# Patient Record
Sex: MALE | Race: WHITE | NOT HISPANIC OR LATINO | Employment: FULL TIME | URBAN - METROPOLITAN AREA
[De-identification: names, ages, dates, MRNs, and addresses within clinical notes are randomized per-mention and may not be internally consistent; named-entity substitution may affect disease eponyms.]

---

## 2017-08-28 ENCOUNTER — CLINICAL SUPPORT (OUTPATIENT)
Dept: AUDIOLOGY | Facility: CLINIC | Age: 32
End: 2017-08-28
Payer: COMMERCIAL

## 2017-08-28 ENCOUNTER — OFFICE VISIT (OUTPATIENT)
Dept: OTOLARYNGOLOGY | Facility: CLINIC | Age: 32
End: 2017-08-28
Payer: COMMERCIAL

## 2017-08-28 VITALS
BODY MASS INDEX: 26.37 KG/M2 | DIASTOLIC BLOOD PRESSURE: 80 MMHG | HEIGHT: 68 IN | SYSTOLIC BLOOD PRESSURE: 136 MMHG | WEIGHT: 174 LBS

## 2017-08-28 DIAGNOSIS — H90.0 CONDUCTIVE HEARING LOSS, BILATERAL: Primary | ICD-10-CM

## 2017-08-28 DIAGNOSIS — H90.0 CONDUCTIVE HEARING LOSS, BILATERAL: ICD-10-CM

## 2017-08-28 DIAGNOSIS — J01.90 ACUTE NON-RECURRENT SINUSITIS, UNSPECIFIED LOCATION: ICD-10-CM

## 2017-08-28 DIAGNOSIS — H65.03 BILATERAL ACUTE SEROUS OTITIS MEDIA, RECURRENCE NOT SPECIFIED: Primary | ICD-10-CM

## 2017-08-28 PROCEDURE — 3008F BODY MASS INDEX DOCD: CPT | Mod: S$GLB,,, | Performed by: NURSE PRACTITIONER

## 2017-08-28 PROCEDURE — 99999 PR PBB SHADOW E&M-EST. PATIENT-LVL III: CPT | Mod: PBBFAC,,, | Performed by: NURSE PRACTITIONER

## 2017-08-28 PROCEDURE — 99203 OFFICE O/P NEW LOW 30 MIN: CPT | Mod: S$GLB,,, | Performed by: NURSE PRACTITIONER

## 2017-08-28 PROCEDURE — 92557 COMPREHENSIVE HEARING TEST: CPT | Mod: S$GLB,,, | Performed by: AUDIOLOGIST-HEARING AID FITTER

## 2017-08-28 PROCEDURE — 92567 TYMPANOMETRY: CPT | Mod: S$GLB,,, | Performed by: AUDIOLOGIST-HEARING AID FITTER

## 2017-08-28 RX ORDER — AZELASTINE 1 MG/ML
1 SPRAY, METERED NASAL 2 TIMES DAILY
Qty: 30 ML | Refills: 2 | Status: SHIPPED | OUTPATIENT
Start: 2017-08-28 | End: 2018-08-28

## 2017-08-28 RX ORDER — AMOXICILLIN AND CLAVULANATE POTASSIUM 875; 125 MG/1; MG/1
1 TABLET, FILM COATED ORAL EVERY 12 HOURS
Qty: 20 TABLET | Refills: 0 | Status: SHIPPED | OUTPATIENT
Start: 2017-08-28 | End: 2017-09-07

## 2017-08-28 RX ORDER — PSEUDOEPHEDRINE HCL 120 MG/1
120 TABLET, FILM COATED, EXTENDED RELEASE ORAL
COMMUNITY

## 2017-08-28 NOTE — PROGRESS NOTES
Subjective:       Patient ID: Gorge Mcintosh is a 31 y.o. male.    Chief Complaint: Otalgia and Ear Fullness    Sinusitis   This is a new problem. The current episode started in the past 7 days. The problem is unchanged. There has been no fever. He is experiencing no pain. Associated symptoms include sinus pressure. Pertinent negatives include no chills, congestion, coughing, diaphoresis, ear pain, headaches, hoarse voice, neck pain, shortness of breath, sneezing, sore throat or swollen glands. Past treatments include oral decongestants (Sudafed). The treatment provided no relief.   Otalgia    There is pain in both ears. This is a new problem. The current episode started in the past 7 days. The problem occurs constantly. The problem has been unchanged. There has been no fever. The pain is at a severity of 2/10. The pain is mild. Associated symptoms include hearing loss. Pertinent negatives include no abdominal pain, coughing, diarrhea, ear discharge, headaches, neck pain, rash, rhinorrhea, sore throat or vomiting. Treatments tried: Sudafed. The treatment provided no relief. There is no history of a chronic ear infection, hearing loss or a tympanostomy tube. Recent airplane flight to and from [place]        Past Medical History: Patient has no past medical history on file.    Past Surgical History: Patient has no past surgical history on file.    Social History: Patient reports that he has never smoked. He has never used smokeless tobacco. He reports that he does not drink alcohol or use drugs.    Family History: family history is not on file.    Medications:   Current Outpatient Prescriptions   Medication Sig    amoxicillin-clavulanate 875-125mg (AUGMENTIN) 875-125 mg per tablet Take 1 tablet by mouth every 12 (twelve) hours.    pseudoephedrine (SUDAFED) 120 mg 12 hr tablet Take 120 mg by mouth every 12 (twelve) hours.    azelastine (ASTELIN) 137 mcg (0.1 %) nasal spray 1 spray (137 mcg total) by Nasal  "route 2 (two) times daily.     No current facility-administered medications for this visit.        Allergies: Patient has No Known Allergies.    Review of Systems   Constitutional: Negative for activity change, appetite change, chills, diaphoresis, fatigue, fever and unexpected weight change.   HENT: Positive for hearing loss, postnasal drip and sinus pressure. Negative for congestion, dental problem, ear discharge, ear pain, facial swelling, hoarse voice, mouth sores, nosebleeds, rhinorrhea, sneezing, sore throat, tinnitus, trouble swallowing and voice change.    Eyes: Negative for pain and visual disturbance.   Respiratory: Negative for cough, choking, chest tightness, shortness of breath, wheezing and stridor.    Cardiovascular: Negative for chest pain.   Gastrointestinal: Negative for abdominal pain, diarrhea, nausea and vomiting.   Musculoskeletal: Negative for gait problem, neck pain and neck stiffness.   Skin: Negative for color change, rash and wound.   Allergic/Immunologic: Negative for environmental allergies.   Neurological: Negative for dizziness, seizures, syncope, facial asymmetry, speech difficulty, weakness, light-headedness, numbness and headaches.   Psychiatric/Behavioral: Negative for agitation, confusion and decreased concentration. The patient is not nervous/anxious.        Objective:       /80 (BP Location: Right arm, Patient Position: Sitting)   Ht 5' 8" (1.727 m)   Wt 78.9 kg (174 lb)   BMI 26.46 kg/m²     Physical Exam   Constitutional: He is oriented to person, place, and time. He appears well-developed and well-nourished.   HENT:   Head: Normocephalic and atraumatic. Not macrocephalic and not microcephalic. Head is without raccoon's eyes, without Agrawal's sign, without abrasion, without contusion, without laceration, without right periorbital erythema and without left periorbital erythema. Hair is normal.   Right Ear: External ear and ear canal normal. No lacerations. No drainage, " swelling or tenderness. No foreign bodies. No mastoid tenderness. Tympanic membrane is retracted. Tympanic membrane is not injected, not scarred, not perforated, not erythematous and not bulging. Tympanic membrane mobility is normal. A middle ear effusion is present. No hemotympanum. No decreased hearing is noted.   Left Ear: External ear and ear canal normal. No lacerations. No drainage, swelling or tenderness. No foreign bodies. No mastoid tenderness. Tympanic membrane is retracted. Tympanic membrane is not injected, not scarred, not perforated, not erythematous and not bulging. Tympanic membrane mobility is normal. A middle ear effusion is present. No hemotympanum. No decreased hearing is noted.   Nose: Nose normal. No mucosal edema, rhinorrhea, nose lacerations, sinus tenderness or nasal deformity.   Mouth/Throat: Uvula is midline.   No OE.  Facial Nerve Intact.   Eyes: Conjunctivae, EOM and lids are normal. Pupils are equal, round, and reactive to light.   Neck: Trachea normal and normal range of motion. Neck supple. No spinous process tenderness and no muscular tenderness present. No neck rigidity. No edema, no erythema and normal range of motion present. No thyroid mass and no thyromegaly present.   Pulmonary/Chest: Effort normal.   Abdominal: Soft.   Musculoskeletal: Normal range of motion.   Lymphadenopathy:        Head (right side): No submental, no submandibular, no tonsillar, no preauricular and no posterior auricular adenopathy present.        Head (left side): No submental, no submandibular, no tonsillar, no preauricular, no posterior auricular and no occipital adenopathy present.     He has no cervical adenopathy.   Neurological: He is alert and oriented to person, place, and time. No cranial nerve deficit or sensory deficit.   Skin: Skin is warm and dry.   Psychiatric: He has a normal mood and affect. His behavior is normal. Judgment and thought content normal.   Nursing note and vitals reviewed.       As a result of this patients history and examination findings, a comprehensive audiogram was ordered to determine the level of hearing/hearing loss.        Assessment:       1. Bilateral acute serous otitis media, recurrence not specified    2. Conductive hearing loss, bilateral    3. Acute non-recurrent sinusitis, unspecified location        Plan:       Medrol dose pack.  Augmentin 875/125 mg po twice per day x 10 days.  Ears and Altitude pamphlet with emphasis on insufflation techniques.  In office politzerization attempted (successful).  No airplane travel.  Yeison Med Sinus Rinse daily; distilled water only.  OTC Flonase twice per day (spray laterally).  Astelin twice per day (spray laterally).  OTC Claritin/Zyrtec prn (allergy symptoms).  RTC in 2 weeks with repeat audio with tymps.

## 2017-08-28 NOTE — PROGRESS NOTES
Gorge Mcintosh was seen in the clinic today for a hearing evaluation. Mr. Mcintosh reported hearing loss worse in his left ear following a flight.    Audiological testing revealed a mild low frequency conductive hearing loss in the right ear and a mild conductive hearing loss in the left ear. A speech reception threshold was obtained at 10 dBHL in the right ear and 25 dBHL in the left ear. Speech discrimination was 96% in the right ear and 92% in the left ear.    Tympanometry revealed a Type C tympanogram in the right ear and a flat Type B tympanogram with normal ear canal volume in the left ear.    Recommendations:  1. Otologic evaluation  2. Follow-up hearing evaluation

## 2019-03-13 ENCOUNTER — HOSPITAL ENCOUNTER (EMERGENCY)
Facility: OTHER | Age: 34
Discharge: HOME | End: 2019-03-13
Attending: EMERGENCY MEDICINE
Payer: COMMERCIAL

## 2019-03-13 VITALS
TEMPERATURE: 98 F | HEIGHT: 68 IN | DIASTOLIC BLOOD PRESSURE: 88 MMHG | RESPIRATION RATE: 16 BRPM | HEART RATE: 77 BPM | SYSTOLIC BLOOD PRESSURE: 142 MMHG | WEIGHT: 170 LBS | OXYGEN SATURATION: 97 % | BODY MASS INDEX: 25.76 KG/M2

## 2019-03-13 DIAGNOSIS — S05.02XA ABRASION OF LEFT CORNEA, INITIAL ENCOUNTER: Primary | ICD-10-CM

## 2019-03-13 PROCEDURE — 99283 EMERGENCY DEPT VISIT LOW MDM: CPT

## 2019-03-13 PROCEDURE — 25000003 PHARM REV CODE 250: Performed by: EMERGENCY MEDICINE

## 2019-03-13 RX ORDER — PROPARACAINE HYDROCHLORIDE 5 MG/ML
1 SOLUTION/ DROPS OPHTHALMIC
Status: COMPLETED | OUTPATIENT
Start: 2019-03-13 | End: 2019-03-13

## 2019-03-13 RX ADMIN — PROPARACAINE HYDROCHLORIDE 1 DROP: 5 SOLUTION/ DROPS OPHTHALMIC at 01:03

## 2019-03-13 RX ADMIN — FLUORESCEIN SODIUM 1 EACH: 1 STRIP OPHTHALMIC at 01:03

## 2019-03-13 NOTE — ED PROVIDER NOTES
Encounter Date: 3/13/2019    SCRIBE #1 NOTE: I, Samra Keenan, am scribing for, and in the presence of, Dr. Martinez.       History     Chief Complaint   Patient presents with    Eye Pain     diagnosed with corneal abrasion to L eye today.      Time seen by provider: 1:07 AM    This is a 33 y.o. male who presents with complaint of left eye pain after a piece of cardboard fell into his eye.  He reports going to urgent care four hours ago and being diagnosed with corneal abrasion.  He denies fever, SOB, or CP.  He last took two hydrocodone 1.5 hours ago along with two Tylenol, with no relief of pain.  His tetanus is not UTD, and patient denies vaccine today.      The history is provided by the patient and the spouse.     Review of patient's allergies indicates:  No Known Allergies  History reviewed. No pertinent past medical history.  History reviewed. No pertinent surgical history.  History reviewed. No pertinent family history.  Social History     Tobacco Use    Smoking status: Never Smoker    Smokeless tobacco: Never Used   Substance Use Topics    Alcohol use: No    Drug use: No     Review of Systems   Constitutional: Negative for chills and fever.   HENT: Negative for congestion.    Eyes: Positive for pain.   Respiratory: Negative for shortness of breath.    Cardiovascular: Negative for chest pain.   Genitourinary: Negative for difficulty urinating.   Musculoskeletal: Negative for back pain and neck pain.   Skin: Negative for color change, pallor and rash.   Neurological: Negative for headaches.   Hematological: Negative for adenopathy. Does not bruise/bleed easily.       Physical Exam     Initial Vitals [03/13/19 0044]   BP Pulse Resp Temp SpO2   (!) 142/88 77 16 98 °F (36.7 °C) 97 %      MAP       --         Physical Exam    Nursing note and vitals reviewed.  Constitutional: He appears well-developed and well-nourished. He is not diaphoretic. No distress.   HENT:   Head: Normocephalic and atraumatic.    Eyes:   Diffuse conjunctival erythema left eye. Fluorescein stain with large corneal abrasion over inferior lateral iris in semicircular pattern.   Neck: Normal range of motion. Neck supple.   Pulmonary/Chest: No respiratory distress.   Musculoskeletal: Normal range of motion. He exhibits no edema or tenderness.   Neurological: He is alert and oriented to person, place, and time.   Skin: Skin is warm and dry. No rash noted. No erythema. No pallor.   Psychiatric: He has a normal mood and affect. His behavior is normal. Judgment and thought content normal.         ED Course   Procedures  Labs Reviewed - No data to display       Imaging Results    None          Medical Decision Making:   Initial Assessment:       33-year-old male presents with worsening pain from corneal abrasions s/p accidentally hit in the left eye earlier today.  She was seen in urgent care and diagnosed with corneal abrasion, and started on antibiotic eyedrops and given Norco for pain. He has been taking Norco with no improvement, and pain returned about 30 min after receiving topical anesthetic by urgent care.  He is having difficulty opening his eyes due to significant photophobia.   After topical anesthesia here pain much relieved and patient is able to open eyes without discomfort.  Fluorescein stain repeated and shows large area of uptake consistent with abrasion over inferior lateral quadrant of iris; no sign of globe rupture or other injuries.  Patient advised to get tetanus but refuses at this time.   I gave him the bottle of proparacaine ophthalmological drops to use only tonight.  Traditional Ophthalmology teaching advises against using this for prolonged period of time, but recent EM literature shows no adverse effect.  I discussed this with patient and he elects to take it since Norco or other pain medicines is not effective, and understands potential risks.  He will see an ophthalmologist in the morning for re-evaluation.                 Scribe Attestation:   Scribe #1: I performed the above scribed service and the documentation accurately describes the services I performed. I attest to the accuracy of the note.    Attending Attestation:           Physician Attestation for Scribe:  Physician Attestation Statement for Scribe #1: I, Dr. Martinez, reviewed documentation, as scribed by Samra Hussein in my presence, and it is both accurate and complete.                    Clinical Impression:     1. Abrasion of left cornea, initial encounter                                   Trace Martinez MD  03/16/19 0042

## 2020-07-16 ENCOUNTER — OFFICE VISIT (OUTPATIENT)
Dept: UROLOGY | Facility: CLINIC | Age: 35
End: 2020-07-16
Payer: COMMERCIAL

## 2020-07-16 VITALS
WEIGHT: 170.63 LBS | HEART RATE: 56 BPM | DIASTOLIC BLOOD PRESSURE: 65 MMHG | HEIGHT: 67 IN | BODY MASS INDEX: 26.78 KG/M2 | SYSTOLIC BLOOD PRESSURE: 99 MMHG

## 2020-07-16 DIAGNOSIS — Z30.2 ENCOUNTER FOR MALE STERILIZATION PROCEDURE: Primary | ICD-10-CM

## 2020-07-16 PROCEDURE — 3008F BODY MASS INDEX DOCD: CPT | Mod: CPTII,S$GLB,, | Performed by: UROLOGY

## 2020-07-16 PROCEDURE — 99999 PR PBB SHADOW E&M-EST. PATIENT-LVL III: ICD-10-PCS | Mod: PBBFAC,,, | Performed by: UROLOGY

## 2020-07-16 PROCEDURE — 3008F PR BODY MASS INDEX (BMI) DOCUMENTED: ICD-10-PCS | Mod: CPTII,S$GLB,, | Performed by: UROLOGY

## 2020-07-16 PROCEDURE — 99999 PR PBB SHADOW E&M-EST. PATIENT-LVL III: CPT | Mod: PBBFAC,,, | Performed by: UROLOGY

## 2020-07-16 PROCEDURE — 99204 PR OFFICE/OUTPT VISIT, NEW, LEVL IV, 45-59 MIN: ICD-10-PCS | Mod: S$GLB,,, | Performed by: UROLOGY

## 2020-07-16 PROCEDURE — 99204 OFFICE O/P NEW MOD 45 MIN: CPT | Mod: S$GLB,,, | Performed by: UROLOGY

## 2020-07-16 NOTE — PATIENT INSTRUCTIONS
Having a Vasectomy: Before, During, and After the Procedure  Vasectomy is an outpatient (same day) procedure. It can be done in a doctors office, clinic, or hospital. Your doctor will talk with you about preparing for surgery. He or she will also discuss the possible risks and complications with you. After the procedure, follow all your doctors advice for recovery.  Preparing for Surgery      The cut ends of the vas may be tied, closed with a clip, or sealed by heat (cauterized).    Your doctor will talk with you about getting ready for surgery. You may be asked to do the following:  · Sign a consent form. This must be done at least a few days before surgery. It gives your doctor permission to do the procedure. It also states that a vasectomy is not guaranteed to make you sterile.  · Dont take aspirin, ibuprofen, or naproxen for 1 week before surgery or 1 week after. These medications can cause bleeding after the procedure. Also, tell your doctor if you take any medications, supplements, or herbal remedies.  · Tell your doctor if youve had any prior scrotal surgery.  · Arrange for an adult family member or friend to give you a ride home after surgery.  · Shower and clean your scrotum the day of surgery. Your doctor may also ask you to shave your scrotum.  · Bring an athletic supporter (jockstrap) and a pair of loose fitting pants to the doctors office or hospital.  · Eat no more than a light snack before surgery.  During Surgery  The entire procedure usually lasts less than 30 minutes.  · Youll be asked to undress and lie on a table.  · You may be given medication to help you relax. To prevent pain during surgery, youll be given an injection of local anesthetic in your scrotum or lower groin.  · Once the area is numb, one or two small incisions are made in the scrotum.   · The vas deferens are lifted through the incision and cut. The ends of the vas are then sealed off using one of several methods.  · If  needed, the incision is closed with stitches.  · You can rest for a while until youre ready to go home.  Recovering at Home  For about a week, your scrotum may look bruised and slightly swollen. You may also have a small amount of bloody discharge from the incision. This is normal.  To help make your recovery more comfortable, follow the tips below.  · Stay off your feet as much as possible for the first 2 days.   · Wear an athletic supporter or snug cotton briefs for support.  · Ice the area for 24 hours  · Take medications with acetaminophen (such as Tylenol) to relieve any discomfort. Dont use aspirin, ibuprofen, or naproxen.  · Avoid heavy lifting, exercise, or intercourse for 7 days.  · Remember: You must use another form of birth control until youre completely sterile.  Call your doctor if you notice any of the following after surgery:   · Increasing pain or swelling in your scrotum  · A large black-and-blue area, or a growing lump  · Fever or chills  · Increasing redness or drainage of the incision  · Trouble urinating    Sex After Vasectomy  Vasectomy doesnt change your sexual function. So when you start having sex again, it should feel the same as before. A vasectomy also shouldnt affect your relationship with your partner. Its important to remember, though, that you wont become sterile right away. It will take time before you can have sex without the need for birth control.  · Until youre sterile: After a vasectomy, some active sperm still remain in your semen. It will take time and many ejaculations before the sperm are completely gone. During this period, you must use another birth control method to prevent pregnancy. To make sure no sperm are left in your semen, youll need to have one or more semen exams. You usually collect a semen sample at home and bring it to a lab. The sample is then checked under a microscope. Youre sterile only when these samples show no evidence of sperm. Ask your  doctor whether additional follow-up is needed.  · After youre sterile: After your doctor tells you youre sterile, you no longer need to use any form of birth control. Youre free to have sex without the fear of unwanted pregnancy. However, a vasectomy does not protect you from sexually transmitted diseases (STDs). If you have more than one sex partner, be sure to practice safer sex by using condoms.  © 3896-3313 [facility], [address] [place] [address], [place] [address]. All rights reserved. This information is not intended as a substitute for professional medical care. Always follow your healthcare professional's instructions.    Vasectomy: Risks and Complications  A vasectomy is an outpatient procedure. This means youll go home the same day. Its done in a doctors office, clinic, or hospital. Before your procedure, youll be asked to read and sign a consent form. This form states youre aware of the possible risks and complications. It also says that you understand that the procedure, though most often successful, cant promise to make you sterile. Be sure you have all your questions answered before you sign this form. Below is a list of risks and possible complications of the procedure.  Risks and Possible Complications of Vasectomy   Vasectomy is safe. But it does have risks. They include the following:  · Bleeding or infection.  · Sperm granuloma. This is a small, harmless lump. It may form where the vas deferens is sealed off.  · Loss of Testicle  · Epididymitis.This is inflammation that may cause scrotal aching. It often goes away without treatment. Anti-inflammatory medications can provide relief.  · Reconnection of the vas deferens. This can occur in rare cases. It makes you fertile again. This can result in an unwanted pregnancy.  · Sperm antibodies.These are a common response of the body to absorbed sperm. The antibodies can make you sterile. This is true even if you later try to reverse your  vasectomy.  · Long-term testicular discomfort.This may occur after surgery. But its very rare.    © 2034-5833 [facility], [address] [place] [address], [place] [address]. All rights reserved. This information is not intended as a substitute for professional medical care. Always follow your healthcare professional's instructions.

## 2020-07-16 NOTE — PROGRESS NOTES
Chief Complaint:   Undesired fertility    HPI:  Mr. Mcintosh is a 34 y.o.  male who is friends with Naman Lam who presents for evaluation re vasectomy. He has 4 children, ages 6, 3, 2, and < 2 yo, and he is certain that he desires no more. He is aware of other forms of contraception.  He's currently using nothing for contraception. He is aware that vasectomy is to be considered permanent/irreversible.    He denies orchalgia.  No dysuria.  No hematuria.    ROS:  Gorge Mcintosh denies headache, blurred vision, fever, nausea, vomiting, chills, abdominal pain, chest pain, sore throat, bleeding per rectum, cough, SOB, recent loss of consciousness, recent mental status changes, seizures, dizziness, or upper or lower extremity weakness.    Past Medical History    History reviewed. No pertinent past medical history.    Past Surgical History    History reviewed. No pertinent surgical history.    Social History    Social History     Socioeconomic History    Marital status:      Spouse name: Not on file    Number of children: Not on file    Years of education: Not on file    Highest education level: Not on file   Occupational History    Not on file   Social Needs    Financial resource strain: Not on file    Food insecurity     Worry: Not on file     Inability: Not on file    Transportation needs     Medical: Not on file     Non-medical: Not on file   Tobacco Use    Smoking status: Never Smoker    Smokeless tobacco: Never Used   Substance and Sexual Activity    Alcohol use: No    Drug use: No    Sexual activity: Not on file   Lifestyle    Physical activity     Days per week: Not on file     Minutes per session: Not on file    Stress: Not on file   Relationships    Social connections     Talks on phone: Not on file     Gets together: Not on file     Attends [employer] service: Not on file     Active member of club or organization: Not on file     Attends meetings of clubs or organizations: Not on  file     Relationship status: Not on file   Other Topics Concern    Not on file   Social History Narrative    Not on file       Family History  History reviewed. No pertinent family history.      Medications    Current Outpatient Medications:     azelastine (ASTELIN) 137 mcg (0.1 %) nasal spray, 1 spray (137 mcg total) by Nasal route 2 (two) times daily., Disp: 30 mL, Rfl: 2    pseudoephedrine (SUDAFED) 120 mg 12 hr tablet, Take 120 mg by mouth every 12 (twelve) hours., Disp: , Rfl:     Allergies  Review of patient's allergies indicates:  No Known Allergies      ?  PHYSICAL EXAM:    The patient generally appears in good health, is appropriately interactive, and is in no apparent distress.     Eyes: anicteric sclerae, moist conjunctivae; no lid-lag; PERRLA     HENT: Atraumatic; oropharynx clear with moist mucous membranes and no mucosal ulcerations;normal hard and soft palate.  No evidence of lymphadenopathy.    Neck: Trachea midline.  No thyromegaly.    Musculoskeletal: No abnormal gait.    Skin: No lesions.    Mental: Cooperative with normal affect.  Is oriented to time, place, and person.    Neuro: Grossly intact.    Chest: Normal inspiratory effort.   No accessory muscles.  No audible wheezes.  Respirations symmetric on inspiration and expiration.    Heart: Regular rhythm.      Abdomen:  Soft, non-tender. No masses or organomegaly. Bladder is not palpable. No evidence of flank discomfort. No evidence of inguinal hernia.    Genitourinary: The penis has no evidence of plaques or induration. The urethral meatus is normal. The testes, epididymides, and cord structures are normal in size and contour bilaterally. The scrotum is normal in size and contour.    Extremities: No clubbing, cyanosis, or edema          A/P:  Gorge Mcintosh is a 34 y.o. male who presents for evaluation re vasectomy.    1.I discussed with the patient risks and benefits, as well as alternatives. We discussed possible complications at  length, including fever, infection, bleeding--possibly requiring reoperation,testicular injury or atrophy with loss of function, chronic pain, persistent or recurrent presence of sperm in the ejaculate, vasal recanalization, as well as the risks attendant to the anesthetic.  2.We discussed that he should stop aspirin, ibuprofen, and similar products at least one week prior to the procedure. Acetominophen is okay to use for headaches, pain, etc.  3. He should bring an athletic supporter and loose fitting sweat pants on the day of the procedure.  4. We discussed that he must continue to use contraception until two consecutive semen analyses (checked at approximately 4-6 weeks post-vasectomy) reveal azoospermia.  5. He will schedule an elective vasectomy.

## 2020-08-19 ENCOUNTER — TELEPHONE (OUTPATIENT)
Dept: UROLOGY | Facility: CLINIC | Age: 35
End: 2020-08-19

## 2020-08-19 RX ORDER — DIAZEPAM 2 MG/1
TABLET ORAL
Qty: 3 TABLET | Refills: 0 | Status: SHIPPED | OUTPATIENT
Start: 2020-08-19 | End: 2020-09-18

## 2020-08-19 NOTE — TELEPHONE ENCOUNTER
----- Message from Jeimy Sadler MA sent at 8/19/2020  2:37 PM CDT -----  Contact: [phone]  pt is requesting valium for his vasectomy on 8/28/2020    [phone]

## 2020-08-19 NOTE — TELEPHONE ENCOUNTER
Spoke to pt. Pt requesting valium prior to vasectomy procedure. Script printed for pt by . pt aware and verbalized understanding.

## 2020-08-28 ENCOUNTER — PROCEDURE VISIT (OUTPATIENT)
Dept: UROLOGY | Facility: CLINIC | Age: 35
End: 2020-08-28
Payer: COMMERCIAL

## 2020-08-28 VITALS
WEIGHT: 168.31 LBS | HEART RATE: 72 BPM | TEMPERATURE: 98 F | BODY MASS INDEX: 25.51 KG/M2 | DIASTOLIC BLOOD PRESSURE: 76 MMHG | SYSTOLIC BLOOD PRESSURE: 135 MMHG | RESPIRATION RATE: 17 BRPM | HEIGHT: 68 IN

## 2020-08-28 DIAGNOSIS — Z30.2 ENCOUNTER FOR MALE STERILIZATION PROCEDURE: ICD-10-CM

## 2020-08-28 PROCEDURE — 55250 REMOVAL OF SPERM DUCT(S): CPT | Mod: S$GLB,,, | Performed by: UROLOGY

## 2020-08-28 PROCEDURE — 55250 PR REMOVAL OF SPERM DUCT(S): ICD-10-PCS | Mod: S$GLB,,, | Performed by: UROLOGY

## 2020-08-28 RX ORDER — OXYCODONE AND ACETAMINOPHEN 5; 325 MG/1; MG/1
1 TABLET ORAL EVERY 4 HOURS PRN
Qty: 8 TABLET | Refills: 0 | Status: SHIPPED | OUTPATIENT
Start: 2020-08-28 | End: 2020-09-07

## 2020-08-28 RX ORDER — LIDOCAINE HYDROCHLORIDE 10 MG/ML
20 INJECTION INFILTRATION; PERINEURAL
Status: COMPLETED | OUTPATIENT
Start: 2020-08-28 | End: 2020-08-28

## 2020-08-28 RX ORDER — CEPHALEXIN 500 MG/1
500 CAPSULE ORAL 4 TIMES DAILY
Qty: 8 CAPSULE | Refills: 0 | Status: SHIPPED | OUTPATIENT
Start: 2020-08-28 | End: 2020-08-30

## 2020-08-28 RX ADMIN — LIDOCAINE HYDROCHLORIDE 20 ML: 10 INJECTION INFILTRATION; PERINEURAL at 08:08

## 2020-08-28 NOTE — PROCEDURES
Procedures Date: 08/28/2020     Surgeon: Brittany    Assistant: None    Procedure performed: Bilateral vasectomy    Blood loss: Min.    Specimen: None    Procedure in detail:  After informed consent was obtained, the patient was placed in the supine position.  The skin was sterilized with a prep.  Attention was then turned to the patient's left hemiscrotum.    The vas was isolated on this side.  Local anesthesia was applied with 1% lidocaine.  The skin overlying the vas was incised sharply.  The vas was then isolated and grasped with a ring forceps.  It was brought through the incision.  The adventia overlying the vas was incised sharply.  The vas was then doubly clamped with a hemostat.  The vas was divided between the two hemostats with a 15 blade scalpel.    The ends of the vas were cauterized and tied with 2-0 silk sutures.  Two sutures were placed on each side. Electrocautery was used to obtain hemostasis.  A fascial interposition was then done with a 3-0 chromic.    The wound was then closed with a 3-0 chromic.    Attention was then turned to the right hemiscrotum and the exact same procedure was done. The vas was isolated on this side.  Local anesthesia was applied with 1% lidocaine.  The skin overlying the vas was incised sharply.  The vas was then isolated and grasped with a ring forceps.  It was brought through the incision.  The adventia overlying the vas was incised sharply.  The vas was then doubly clamped with a hemostat.  The vas was divided between the two hemostats with a 15 blade scalpel.    The ends of the vas were cauterized and tied with 2-0 silk sutures.  Two sutures were placed on each side. Electrocautery was used to obtain hemostasis.  A fascial interposition was then done with a 3-0 chromic.    The wound was then closed with a 3-0 chromic.    He tolerated the procedure well without complication.  He was advised to continue contraception until he brings in 2 semen samples that show no sperm.   He is also to avoid lifting, strenuous exercise, intercourse, NSAIDS, and ASA for 1 week.  He will be discharged home is stable condition.  He is to follow up prn.

## 2020-08-28 NOTE — PATIENT INSTRUCTIONS
What to Expect After a Vasectomy  You cannot drive or operate heavy machinery on the day of the procedure. Begin prescription antibiotics today and take as directed until finished. Dr. Soto will give you a prescription for a narcotic pain medication. You may choose to take the prescription medication or Tylenol only for minor discomfort. Do not take any NSAIDS (e.g., Motrin, Naprosyn, etc.) or aspirin for at least one week, as these products can thin the blood.    Apply ice packs to the scrotal area for 24-48 hours. Avoid direct contact of the ice pack with the skin. Scrotal supports, jock straps, or fitted underwear help elevate the scrotum and reduce discomfort.    You may shower the next day. Gently apply soapy water to the scrotum to wash. Rinse and dry yourself by blotting the skin, not rubbing.    Avoid strenuous physical exercises or sexual relations for at least one week after a vasectomy.    Continue to use birth control for at least 6-8 weeks or 20 ejaculations. You are still considered fertile until your urologist examines a post-vasectomy semen analysis after 20 ejaculations and a second one a few days after the first. Drop off the specimens at the 4th floor [facility] between 8am and 4pm.    Do NOT resume unprotected sexual activity until your physician finds no sperm in your semen.    All stitches will dissolve on their own in 1-2 weeks.    Signs and Symptoms to Report  · A large amount of bleeding at the site.  · An unusual amount of pain.  · A large amount of swelling in the scrotum.  · Fever and chills.  · Any signs of infection, such as redness at the site or foul-smelling discharge    Risks  The risks of complication after vasectomy are very low.    A few of the risks include:  · Bleeding.  · Infection.  · Scrotal hematoma - a collection of blood in the scrotum.  · Inflammation of the epididymis - inflammation of a structure next to the testicle that helps in maturation of the  sperm.  · Sperm granuloma - a collection of sperm that leaks out from the vas deferens, forming a small nodule or lump. This does not usually cause any discomfort, but you may feel it in the scrotum.  · Recanalization - the restoration of the lumen or transport tube between the two ends of the vas deferens, possibly causing fertility.    If you have any questions or concerns, please call Dr. Soto at [phone] during regular office hours. If there are any problems after hours or on weekends, you may call [phone] and ask for the urology resident on call.

## 2020-09-03 ENCOUNTER — TELEPHONE (OUTPATIENT)
Dept: UROLOGY | Facility: CLINIC | Age: 35
End: 2020-09-03

## 2020-09-03 NOTE — TELEPHONE ENCOUNTER
Called pt to discuss activity after vasectomy. Message left on pts voicemail. Also message sent to patient portal.

## 2020-09-03 NOTE — TELEPHONE ENCOUNTER
----- Message from Gigi Olmos sent at 9/3/2020  4:18 PM CDT -----  Contact: Pt. [phone]  The patient would like to make sure a certain activity is ok after his vasectomy. Please contact the patient to discuss further.

## 2020-09-18 ENCOUNTER — TELEPHONE (OUTPATIENT)
Dept: UROLOGY | Facility: CLINIC | Age: 35
End: 2020-09-18

## 2020-09-18 NOTE — TELEPHONE ENCOUNTER
Informed him that his post vas clear.  But we do need a 2nd sample.  Alternate BC  Can bring in another sample next week

## 2021-06-15 ENCOUNTER — TELEPHONE (OUTPATIENT)
Dept: OTOLARYNGOLOGY | Facility: CLINIC | Age: 36
End: 2021-06-15

## 2023-02-28 ENCOUNTER — CLINICAL SUPPORT (OUTPATIENT)
Dept: AUDIOLOGY | Facility: CLINIC | Age: 38
End: 2023-02-28
Payer: COMMERCIAL

## 2023-02-28 ENCOUNTER — OFFICE VISIT (OUTPATIENT)
Dept: OTOLARYNGOLOGY | Facility: CLINIC | Age: 38
End: 2023-02-28
Payer: COMMERCIAL

## 2023-02-28 VITALS
DIASTOLIC BLOOD PRESSURE: 83 MMHG | HEART RATE: 60 BPM | WEIGHT: 176.13 LBS | HEIGHT: 68 IN | BODY MASS INDEX: 26.69 KG/M2 | SYSTOLIC BLOOD PRESSURE: 122 MMHG

## 2023-02-28 DIAGNOSIS — H90.12 CONDUCTIVE HEARING LOSS OF LEFT EAR WITH UNRESTRICTED HEARING OF RIGHT EAR: ICD-10-CM

## 2023-02-28 DIAGNOSIS — H92.12 OTORRHEA OF LEFT EAR: ICD-10-CM

## 2023-02-28 DIAGNOSIS — H93.12 TINNITUS, LEFT: ICD-10-CM

## 2023-02-28 DIAGNOSIS — H60.312 ACUTE DIFFUSE OTITIS EXTERNA OF LEFT EAR: Primary | ICD-10-CM

## 2023-02-28 DIAGNOSIS — H90.12 CONDUCTIVE HEARING LOSS OF LEFT EAR WITH UNRESTRICTED HEARING OF RIGHT EAR: Primary | ICD-10-CM

## 2023-02-28 PROCEDURE — 1159F PR MEDICATION LIST DOCUMENTED IN MEDICAL RECORD: ICD-10-PCS | Mod: CPTII,S$GLB,, | Performed by: NURSE PRACTITIONER

## 2023-02-28 PROCEDURE — 99999 PR PBB SHADOW E&M-EST. PATIENT-LVL III: ICD-10-PCS | Mod: PBBFAC,,, | Performed by: NURSE PRACTITIONER

## 2023-02-28 PROCEDURE — 3074F PR MOST RECENT SYSTOLIC BLOOD PRESSURE < 130 MM HG: ICD-10-PCS | Mod: CPTII,S$GLB,, | Performed by: NURSE PRACTITIONER

## 2023-02-28 PROCEDURE — 3079F DIAST BP 80-89 MM HG: CPT | Mod: CPTII,S$GLB,, | Performed by: NURSE PRACTITIONER

## 2023-02-28 PROCEDURE — 3079F PR MOST RECENT DIASTOLIC BLOOD PRESSURE 80-89 MM HG: ICD-10-PCS | Mod: CPTII,S$GLB,, | Performed by: NURSE PRACTITIONER

## 2023-02-28 PROCEDURE — 99999 PR PBB SHADOW E&M-EST. PATIENT-LVL III: CPT | Mod: PBBFAC,,, | Performed by: NURSE PRACTITIONER

## 2023-02-28 PROCEDURE — 3008F BODY MASS INDEX DOCD: CPT | Mod: CPTII,S$GLB,, | Performed by: NURSE PRACTITIONER

## 2023-02-28 PROCEDURE — 3008F PR BODY MASS INDEX (BMI) DOCUMENTED: ICD-10-PCS | Mod: CPTII,S$GLB,, | Performed by: NURSE PRACTITIONER

## 2023-02-28 PROCEDURE — 99999 PR PBB SHADOW E&M-EST. PATIENT-LVL I: ICD-10-PCS | Mod: PBBFAC,,,

## 2023-02-28 PROCEDURE — 3074F SYST BP LT 130 MM HG: CPT | Mod: CPTII,S$GLB,, | Performed by: NURSE PRACTITIONER

## 2023-02-28 PROCEDURE — 92567 TYMPANOMETRY: CPT | Mod: S$GLB,,,

## 2023-02-28 PROCEDURE — 1159F MED LIST DOCD IN RCRD: CPT | Mod: CPTII,S$GLB,, | Performed by: NURSE PRACTITIONER

## 2023-02-28 PROCEDURE — 99999 PR PBB SHADOW E&M-EST. PATIENT-LVL I: CPT | Mod: PBBFAC,,,

## 2023-02-28 PROCEDURE — 99203 PR OFFICE/OUTPT VISIT, NEW, LEVL III, 30-44 MIN: ICD-10-PCS | Mod: 25,S$GLB,, | Performed by: NURSE PRACTITIONER

## 2023-02-28 PROCEDURE — 92557 PR COMPREHENSIVE HEARING TEST: ICD-10-PCS | Mod: S$GLB,,,

## 2023-02-28 PROCEDURE — 92557 COMPREHENSIVE HEARING TEST: CPT | Mod: S$GLB,,,

## 2023-02-28 PROCEDURE — 99203 OFFICE O/P NEW LOW 30 MIN: CPT | Mod: 25,S$GLB,, | Performed by: NURSE PRACTITIONER

## 2023-02-28 PROCEDURE — 92567 PR TYMPA2METRY: ICD-10-PCS | Mod: S$GLB,,,

## 2023-02-28 RX ORDER — CIPROFLOXACIN AND DEXAMETHASONE 3; 1 MG/ML; MG/ML
SUSPENSION/ DROPS AURICULAR (OTIC)
COMMUNITY
Start: 2023-02-27

## 2023-02-28 RX ORDER — AMOXICILLIN 500 MG/1
500 CAPSULE ORAL 2 TIMES DAILY
COMMUNITY
Start: 2023-02-26

## 2023-02-28 NOTE — PROGRESS NOTES
Gorge Mcintosh, a 37 y.o. male, was seen today in the clinic for an audiologic evaluation.  The patient reported his left ear drum ruptured two days ago after a flight.  Mr. Mcintosh reported tinnitus and hearing loss in the left ear.  There were no reports of aural fullness.    Tympanometry revealed Type A in the right ear and Type B (normal ECV) in the left ear.  Audiogram results revealed normal hearing sensitivity in the right ear and a mild sloping to moderately-severe conductive hearing loss in the left ear.  Speech reception thresholds were noted at 10 dB in the right ear and 40 dB in the left ear.  Speech discrimination scores were 100% in the right ear and 100% in the left ear.    Recommendations:  Otologic evaluation  Hearing protection when in noise  Follow-up audio after medical intervention for middle ear dysfunction

## 2023-02-28 NOTE — PROGRESS NOTES
"Subjective:   Gorge Mcintosh is a 37 y.o. male who was self-referred for  ruptured TM .    Gorge Mcintosh presents to clinic for the evaluation of possible ruptured TM with otorrhea AS.  He reports URI symptoms with otalgia that started about 1 week ago. Two days ago after flying he reports his eardrum ruptured while he was sleeping. There has been significant otorrhea since, however otalgia has been mild.  There is associated hearing loss and tinnitus AS. He was started on Amoxicillin (2 days ago) and Ciprodex (yesterday), but noted that he did not feel as though the ear drops were adequately entering the ear canal. There is not a family history of hearing loss at a young age. There is not a prior history of ear surgery. There is not a prior history of ear infections. There is not a history of ear trauma. He denies a history of significant noise exposure. He does not wear hearing aids currently. He has not had a hearing test recently.      Past Medical History  He has no past medical history on file.    Past Surgical History  He has no past surgical history on file.    Family History  His family history is not on file.    Social History  He reports that he has never smoked. He has never used smokeless tobacco. He reports that he does not drink alcohol and does not use drugs.    Allergies  He has No Known Allergies.    Medications  He has a current medication list which includes the following prescription(s): amoxicillin, ciprofloxacin-dexamethasone 0.3-0.1%, azelastine, and pseudoephedrine.  Review of Systems     Constitutional: Negative for chills and fever.      HENT: Positive for ear discharge, ear infection, ear pain, hearing loss and ringing in the ears.        Objective:   /83   Pulse 60   Ht 5' 8" (1.727 m)   Wt 79.9 kg (176 lb 2.4 oz)   BMI 26.78 kg/m²      Constitutional:   He is oriented to person, place, and time. He appears well-developed and well-nourished. He appears alert. He is " cooperative.  Non-toxic appearance. He does not have a sickly appearance. He does not appear ill. Normal speech.      Head:  Normocephalic and atraumatic. Not macrocephalic and not microcephalic. Head is without raccoon's eyes, without Agrawal's sign, without abrasion, without laceration, without right periorbital erythema, without left periorbital erythema and without TMJ tenderness.     Ears:    Right Ear: No lacerations. No drainage, swelling or tenderness. No foreign bodies. No mastoid tenderness. Tympanic membrane is not injected, not scarred, not perforated, not erythematous, not retracted and not bulging. No middle ear effusion. No hemotympanum.   Left Ear: No lacerations. There is drainage, swelling and tenderness. No foreign bodies. No mastoid tenderness. Tympanic membrane is not injected, not scarred, not perforated, not erythematous, not retracted and not bulging.  No middle ear effusion. No hemotympanum.   Ears:      Pulmonary/Chest:   Effort normal.     Psychiatric:   He has a normal mood and affect. His speech is normal and behavior is normal.     Neurological:   He is alert and oriented to person, place, and time.     Procedure  Ear cleaning performed AS.  See procedure note.    Imaging  No pertinent imaging available.    Audiogram    I independently reviewed the tracings of the complete audiometric evaluation performed today.  I reviewed the audiogram with the patient as well.  Pertinent findings include normal hearing sensitivity in the right ear and a mild sloping to moderately-severe conductive hearing loss in the left ear. Type A in the right ear and Type B (normal ECV) in the left ear.   Assessment:     1. Acute diffuse otitis externa of left ear    2. Otorrhea of left ear    3. Conductive hearing loss of left ear with unrestricted hearing of right ear      Plan:   Acute diffuse otitis externa of left ear/ Otorrhea of left ear        -     Left ear exam with drainage, swelling, tenderness, and  erythema        -     Continue Ciprodex for a total of 10 days, complete amoxicillin as directed        -     Dry ear protocol discussed and no instrumentation in the ear        -     Follow-up in 3 weeks with repeat audio    Conductive hearing loss of left ear with unrestricted hearing of right ear  Likely a results of active otitis externa/ healing rupture.

## 2023-02-28 NOTE — PROCEDURES
Ear Cerumen Removal    Date/Time: 2/28/2023 10:30 AM  Performed by: Klarissa Holden NP  Authorized by: Klarissa Holden NP       Local anesthetic:  None  Location details:  Left ear  Cerumen  Removal Results:  Other  Patient tolerance:  Patient tolerated the procedure well with no immediate complications     Procedure Note:    The patient was brought to the minor procedure room and placed under the operating microscope.  The left ear canal was cleaned of purulent debris. Using suction the patient's canal was cleared.  The patient tolerated the procedure well. There were no complications.

## 2023-02-28 NOTE — PATIENT INSTRUCTIONS
External Ear Infection (Adult)    External otitis (also called swimmers ear) is an infection in the ear canal. It is often caused by bacteria or fungus. It can occur a few days after water gets trapped in the ear canal (from swimming or bathing). It can also occur after cleaning too deeply in the ear canal with a cotton swab or other object. Sometimes, hair care products get into the ear canal and cause this problem.  Symptoms can include pain, fever, itching, redness, drainage, or swelling of the ear canal. Temporary hearing loss may also occur.  Home care  Do not try to clean the ear canal. This can push pus and bacteria deeper into the canal.  Use prescribed ear drops as directed. These help reduce swelling and fight the infection. If an ear wick was placed in the ear canal, apply drops right onto the end of the wick. The wick will draw the medication into the ear canal even if it is swollen closed.  A cotton ball may be loosely placed in the outer ear to absorb any drainage.  You may use acetaminophen or ibuprofen to control pain, unless another medication was prescribed. Note: If you have chronic liver or kidney disease or ever had a stomach ulcer or GI bleeding, talk to your health care provider before taking any of these medications.  Do not allow water to get into your ear when bathing. Also, avoid swimming until the infection has cleared.  Prevention  Keep your ears dry. This helps lower the risk of infection. Dry your ears with a towel or hair dryer after getting wet. Also, use ear plugs when swimming.  Do not stick any objects in the ear to remove wax.  If you feel water trapped in your ear, try to dry the ear with a blow dryer.  Use the blow dryer on the lowest power and lowest heat setting and hold about 1 foot away from the ear for about 5-10 seconds to dry the moisture.   Follow-up care  Follow up with your health care provider in one week, or as advised.  When to seek medical advice  Call your  health care provider right away if any of these occur:  Ear pain becomes worse or doesnt improve after 3 days of treatment  Redness or swelling of the outer ear occurs or gets worse  Headache  Painful or stiff neck  Drowsiness or confusion  Fever of 100.4ºF (38ºC) or higher, or as directed by your health care provider  Seizure

## 2023-03-22 ENCOUNTER — OFFICE VISIT (OUTPATIENT)
Dept: OTOLARYNGOLOGY | Facility: CLINIC | Age: 38
End: 2023-03-22
Payer: COMMERCIAL

## 2023-03-22 ENCOUNTER — CLINICAL SUPPORT (OUTPATIENT)
Dept: AUDIOLOGY | Facility: CLINIC | Age: 38
End: 2023-03-22
Payer: COMMERCIAL

## 2023-03-22 VITALS — SYSTOLIC BLOOD PRESSURE: 106 MMHG | DIASTOLIC BLOOD PRESSURE: 73 MMHG

## 2023-03-22 DIAGNOSIS — H93.293 ABNORMAL AUDITORY PERCEPTION OF BOTH EARS: Primary | ICD-10-CM

## 2023-03-22 DIAGNOSIS — H60.312 ACUTE DIFFUSE OTITIS EXTERNA OF LEFT EAR: Primary | ICD-10-CM

## 2023-03-22 PROCEDURE — 1159F PR MEDICATION LIST DOCUMENTED IN MEDICAL RECORD: ICD-10-PCS | Mod: CPTII,S$GLB,, | Performed by: NURSE PRACTITIONER

## 2023-03-22 PROCEDURE — 99999 PR PBB SHADOW E&M-EST. PATIENT-LVL I: ICD-10-PCS | Mod: PBBFAC,,,

## 2023-03-22 PROCEDURE — 3074F SYST BP LT 130 MM HG: CPT | Mod: CPTII,S$GLB,, | Performed by: NURSE PRACTITIONER

## 2023-03-22 PROCEDURE — 92557 COMPREHENSIVE HEARING TEST: CPT | Mod: S$GLB,,,

## 2023-03-22 PROCEDURE — 3078F DIAST BP <80 MM HG: CPT | Mod: CPTII,S$GLB,, | Performed by: NURSE PRACTITIONER

## 2023-03-22 PROCEDURE — 99999 PR PBB SHADOW E&M-EST. PATIENT-LVL III: ICD-10-PCS | Mod: PBBFAC,,, | Performed by: NURSE PRACTITIONER

## 2023-03-22 PROCEDURE — 1159F MED LIST DOCD IN RCRD: CPT | Mod: CPTII,S$GLB,, | Performed by: NURSE PRACTITIONER

## 2023-03-22 PROCEDURE — 99214 PR OFFICE/OUTPT VISIT, EST, LEVL IV, 30-39 MIN: ICD-10-PCS | Mod: S$GLB,,, | Performed by: NURSE PRACTITIONER

## 2023-03-22 PROCEDURE — 92567 TYMPANOMETRY: CPT | Mod: S$GLB,,,

## 2023-03-22 PROCEDURE — 99214 OFFICE O/P EST MOD 30 MIN: CPT | Mod: S$GLB,,, | Performed by: NURSE PRACTITIONER

## 2023-03-22 PROCEDURE — 92567 PR TYMPA2METRY: ICD-10-PCS | Mod: S$GLB,,,

## 2023-03-22 PROCEDURE — 99999 PR PBB SHADOW E&M-EST. PATIENT-LVL I: CPT | Mod: PBBFAC,,,

## 2023-03-22 PROCEDURE — 3078F PR MOST RECENT DIASTOLIC BLOOD PRESSURE < 80 MM HG: ICD-10-PCS | Mod: CPTII,S$GLB,, | Performed by: NURSE PRACTITIONER

## 2023-03-22 PROCEDURE — 92557 PR COMPREHENSIVE HEARING TEST: ICD-10-PCS | Mod: S$GLB,,,

## 2023-03-22 PROCEDURE — 3074F PR MOST RECENT SYSTOLIC BLOOD PRESSURE < 130 MM HG: ICD-10-PCS | Mod: CPTII,S$GLB,, | Performed by: NURSE PRACTITIONER

## 2023-03-22 PROCEDURE — 99999 PR PBB SHADOW E&M-EST. PATIENT-LVL III: CPT | Mod: PBBFAC,,, | Performed by: NURSE PRACTITIONER

## 2023-03-22 NOTE — PROGRESS NOTES
Subjective:   Gorge is a 37 y.o. male who comes for follow-up.    Today:  Gorge Mcintosh presents for 1 month follow-up for otitis externa. Completed otic drops as prescribed. Denies any further otorrhea or otalgia after completing antibiotic drops. Hearing has improved, although he feels at times the left ear is slightly muffled compared with the right. Denies tinnitus or vertigo.     Note from previous visit on 2/28/2023:  Gorge Mcintosh presents to clinic for the evaluation of possible ruptured TM with otorrhea AS.  He reports URI symptoms with otalgia that started about 1 week ago. Two days ago after flying he reports his eardrum ruptured while he was sleeping. There has been significant otorrhea since, however otalgia has been mild.  There is associated hearing loss and tinnitus AS. He was started on Amoxicillin (2 days ago) and Ciprodex (yesterday), but noted that he did not feel as though the ear drops were adequately entering the ear canal. There is not a family history of hearing loss at a young age. There is not a prior history of ear surgery. There is not a prior history of ear infections. There is not a history of ear trauma. He denies a history of significant noise exposure. He does not wear hearing aids currently. He has not had a hearing test recently.      The patient's medications, allergies, past medical, surgical, social and family histories were reviewed and updated as appropriate.    A detailed review of systems was obtained with pertinent positives as per the above HPI, and otherwise negative.   Objective:   /73      Constitutional:   He is oriented to person, place, and time. He appears well-developed and well-nourished. He appears alert. He is cooperative.  Non-toxic appearance. He does not have a sickly appearance. He does not appear ill. Normal speech.      Head:  Normocephalic and atraumatic. Not macrocephalic and not microcephalic. Head is without raccoon's eyes, without  Agrawal's sign, without abrasion, without laceration, without right periorbital erythema, without left periorbital erythema and without TMJ tenderness.     Ears:    Right Ear: No lacerations. No drainage, swelling or tenderness. No foreign bodies. No mastoid tenderness. Tympanic membrane is not injected, not scarred, not perforated, not erythematous, not retracted and not bulging. No middle ear effusion. No hemotympanum.   Left Ear: No lacerations. No drainage, swelling or tenderness. No foreign bodies. No mastoid tenderness. Tympanic membrane is not injected, not scarred, not perforated, not erythematous, not retracted and not bulging.  No middle ear effusion. No hemotympanum.     Pulmonary/Chest:   Effort normal.     Psychiatric:   He has a normal mood and affect. His speech is normal and behavior is normal.     Neurological:   He is alert and oriented to person, place, and time.   Procedure  None    Data Reviewed  No results found for: WBC  No results found for: EOSINOPHIL  No results found for: EOS  No results found for: PLT  No results found for: GLU  No results found for: IGE  Audiogram        I independently reviewed the tracings of the complete audiometric evaluation performed today.  I reviewed the audiogram with the patient as well.  Pertinent findings include a normal study.  Assessment:     1. Acute diffuse otitis externa of left ear      Plan:   I had a long discussion with the patient regarding his condition and the further workup and management options.     Acute diffuse otitis externa of left ear  Completed antibiotic drops as advised.  No signs of infection on otoscopic exam, also no perforation.  Can trial Flonase x a couple of weeks for ear fullness. Follow-up if symptoms persist.

## 2023-03-22 NOTE — PROGRESS NOTES
Gorge Mcintosh, a 37 y.o. male, was seen today in the clinic for a follow-up audiologic evaluation.  The patient was diagnosed with otitis externa in the left ear on 2/28/2023.  A hearing evaluation on 2/28/2023 revealed normal hearing sensitivity in the right ear and a mild sloping to moderately-severe conductive hearing loss in the left ear.  Mr. Mcintosh reported his hearing has improved since his last visit.  He also reported occasional aural fullness in both ears.  Today, the patient denied otalgia, tinnitus, and dizziness.     Tympanometry revealed Type A in the right ear and Type A in the left ear.  Audiogram results revealed normal hearing sensitivity, bilaterally.  Speech reception thresholds were noted at 5 dB in the right ear and 15 dB in the left ear.  Speech discrimination scores were 100% in the right ear and 100% in the left ear.    Recommendations:  Otologic evaluation  Hearing protection when in noise  Repeat audiogram as needed
